# Patient Record
(demographics unavailable — no encounter records)

---

## 2024-11-04 NOTE — REASON FOR VISIT
[Home] : at home, [unfilled] , at the time of the visit. [Medical Office: ([facility])___] : at the medical office located in  [Verbal consent obtained from patient] : the patient, [unfilled] [Follow-up] : a follow-up of an existing diagnosis [FreeTextEntry4] : Poornima Simeon, NP

## 2024-11-04 NOTE — HISTORY OF PRESENT ILLNESS
[FreeTextEntry1] : Patient is a 54-year-old female with a past medical history of depression, tubal ligation, , who presents for evaluation after visit with her GI Dr King. Imaging in the electronic medical records notes and MRI done for the adrenal gland in which pancreatic cysts were noted. Patient had small pancreatic cyst measuring 8 mm visually appearing like an IPMN. Presents today s/p EUS   Patient states she tolerated her procedure well, no complaints. States she was previously taking omeprazole for heart burn but has not experienced any symptoms, and is currently not taking any medications. Patient denies any abdominal pain, nausea, vomiting, dysphagia, heart burn, unintentional weight loss, diarrhea, constipation, melena, hematochezia.   Last colonoscopy was in  or 2024 with Dr. Hines.   EUS 24 EUS:. 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 The PD measured 2 mm. There is no evidence of chronic pancreatitis or pancreatic mass lesion

## 2024-11-04 NOTE — REASON FOR VISIT
[Home] : at home, [unfilled] , at the time of the visit. [Medical Office: ([facility])___] : at the medical office located in  [Verbal consent obtained from patient] : the patient, [unfilled] [Follow-up] : a follow-up of an existing diagnosis [FreeTextEntry4] : Poornima Simeon, NP

## 2024-11-04 NOTE — ASSESSMENT
[FreeTextEntry1] : Patient is a 54-year-old female with a past medical history of depression, tubal ligation, , who presents for evaluation after visit with her GI Dr King. Imaging in the electronic medical records notes and MRI done for the adrenal gland in which pancreatic cysts were noted. Patient had small pancreatic cyst measuring 8 mm visually appearing like an IPMN. Presents today s/p EUS   #Pancreatic Cysts, IPMN -Results reviewed from EUS -Repeat MRI in 1 year  Patient follows with Dr. Hines for general GI.   Follow up in 1 year, or sooner if needed. Patient agreeable to plan as described above